# Patient Record
(demographics unavailable — no encounter records)

---

## 2024-12-11 NOTE — PLAN
[FreeTextEntry1] : New Patient. Pt is a 32 y/o M with no significant PMHx of who presents to establish care. Pt admits to pain when making a fist in his R hand that has occurred for about 2 weeks. Pt admits to weakness but no numbness or tingling. No injury. Pt also notes moderate pain on dorsal aspect of L foot (heel and ankle) when walking that started last week. 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 Lastly, pt admits to ED for about 5 years, has used Sildenafil with success. Pt denies recent hospitalizations, illnesses, trauma. Pt denies fever, chills, headache, SOB, chest pain, abdominal pain.  -care plan reviewed -medications reviewed and sent -Ordered X-ray -Podiatry Referral -labs ordered -RTC 6 months

## 2024-12-11 NOTE — HISTORY OF PRESENT ILLNESS
[FreeTextEntry1] : New Patient. Pt is a 34 y/o M with no significant PMHx who presents to [facility] care. [de-identified] : New Patient. Pt is a 32 y/o M with no significant PMHx of who presents to establish care. Pt admits to pain when making a fist in his R hand that has occurred for about 2 weeks. Pt admits to weakness but no numbness or tingling. No injury. Pt also notes moderate pain on dorsal aspect of L foot (heel and ankle) when walking that started last week. 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 Pt denies recent hospitalizations, illnesses, trauma. Pt denies fever, chills, headache, SOB, chest pain, abdominal pain.

## 2024-12-11 NOTE — HEALTH RISK ASSESSMENT
[Fair] : ~his/her~ current health as fair  [Good] : ~his/her~  mood as  good [Monthly or less (1 pt)] : Monthly or less (1 point) [1 or 2 (0 pts)] : 1 or 2 (0 points) [Never (0 pts)] : Never (0 points) [Yes] : In the past 12 months have you used drugs other than those required for medical reasons? Yes [No falls in past year] : Patient reported no falls in the past year [1] : 2) Feeling down, depressed, or hopeless for several days (1) [PHQ-2 Negative - No further assessment needed] : PHQ-2 Negative - No further assessment needed [HIV Test offered] : HIV Test offered [Hepatitis C test offered] : Hepatitis C test offered [Change in mental status noted] : Change in mental status noted [None] : None [Alone] : lives alone [Employed] : employed [High School] : high school [Single] : single [Sexually Active] : sexually active [Feels Safe at Home] : Feels safe at home [Fully functional (bathing, dressing, toileting, transferring, walking, feeding)] : Fully functional (bathing, dressing, toileting, transferring, walking, feeding) [Fully functional (using the telephone, shopping, preparing meals, housekeeping, doing laundry, using] : Fully functional and needs no help or supervision to perform IADLs (using the telephone, shopping, preparing meals, housekeeping, doing laundry, using transportation, managing medications and managing finances) [Reports changes in hearing] : Reports changes in hearing [Reports normal functional visual acuity (ie: able to read med bottle)] : Reports normal functional visual acuity [Former] : Former [Audit-CScore] : 1 [de-identified] : Marijuana [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 [de-identified] : average [[id number]] : 2 [Language] : denies difficulty with language [Behavior] : denies difficulty with behavior [Learning/Retaining New Information] : denies difficulty learning/retaining new information [Handling Complex Tasks] : denies difficulty handling complex tasks [Reasoning] : denies difficulty with reasoning [Spatial Ability and Orientation] : denies difficulty with spatial ability and orientation [Reports changes in vision] : Reports no changes in vision [Reports changes in dental health] : Reports no changes in dental health [de-identified] : overthinking [FreeTextEntry2] : Transport at [employer] [de-identified] : a little worse, states listens to music loudly [de-identified] : socially